# Patient Record
Sex: MALE | Race: WHITE | NOT HISPANIC OR LATINO | URBAN - METROPOLITAN AREA
[De-identification: names, ages, dates, MRNs, and addresses within clinical notes are randomized per-mention and may not be internally consistent; named-entity substitution may affect disease eponyms.]

---

## 2017-01-24 ENCOUNTER — COMMUNICATION - HEALTHEAST (OUTPATIENT)
Dept: INTERNAL MEDICINE | Facility: CLINIC | Age: 42
End: 2017-01-24

## 2017-01-24 DIAGNOSIS — K60.2 ANAL FISSURE: ICD-10-CM

## 2017-05-05 ENCOUNTER — COMMUNICATION - HEALTHEAST (OUTPATIENT)
Dept: INTERNAL MEDICINE | Facility: CLINIC | Age: 42
End: 2017-05-05

## 2017-08-14 ENCOUNTER — COMMUNICATION - HEALTHEAST (OUTPATIENT)
Dept: INTERNAL MEDICINE | Facility: CLINIC | Age: 42
End: 2017-08-14

## 2017-08-14 DIAGNOSIS — J45.909 ASTHMA: ICD-10-CM

## 2017-08-14 DIAGNOSIS — K60.2 ANAL FISSURE: ICD-10-CM

## 2017-10-19 ENCOUNTER — COMMUNICATION - HEALTHEAST (OUTPATIENT)
Dept: INTERNAL MEDICINE | Facility: CLINIC | Age: 42
End: 2017-10-19

## 2017-11-22 ENCOUNTER — COMMUNICATION - HEALTHEAST (OUTPATIENT)
Dept: INTERNAL MEDICINE | Facility: CLINIC | Age: 42
End: 2017-11-22

## 2017-11-29 ENCOUNTER — OFFICE VISIT - HEALTHEAST (OUTPATIENT)
Dept: INTERNAL MEDICINE | Facility: CLINIC | Age: 42
End: 2017-11-29

## 2017-11-29 DIAGNOSIS — J45.909 ASTHMA: ICD-10-CM

## 2017-11-29 DIAGNOSIS — M10.9 GOUT: ICD-10-CM

## 2017-11-29 DIAGNOSIS — I10 ESSENTIAL HYPERTENSION: ICD-10-CM

## 2017-11-29 RX ORDER — AMLODIPINE BESYLATE 10 MG/1
10 TABLET ORAL DAILY
Qty: 30 TABLET | Refills: 11 | Status: SHIPPED | OUTPATIENT
Start: 2017-11-29

## 2017-11-29 RX ORDER — VENLAFAXINE HYDROCHLORIDE 37.5 MG/1
37.5 CAPSULE, EXTENDED RELEASE ORAL DAILY
Qty: 30 CAPSULE | Refills: 2 | Status: SHIPPED | OUTPATIENT
Start: 2017-11-29

## 2017-11-29 RX ORDER — LISINOPRIL 40 MG/1
40 TABLET ORAL DAILY
Qty: 30 TABLET | Refills: 11 | Status: SHIPPED | OUTPATIENT
Start: 2017-11-29

## 2017-11-30 ENCOUNTER — COMMUNICATION - HEALTHEAST (OUTPATIENT)
Dept: INTERNAL MEDICINE | Facility: CLINIC | Age: 42
End: 2017-11-30

## 2017-12-05 ENCOUNTER — OFFICE VISIT - HEALTHEAST (OUTPATIENT)
Dept: INTERNAL MEDICINE | Facility: CLINIC | Age: 42
End: 2017-12-05

## 2017-12-05 DIAGNOSIS — I10 ESSENTIAL HYPERTENSION: ICD-10-CM

## 2017-12-05 DIAGNOSIS — M10.9 GOUT: ICD-10-CM

## 2017-12-11 ENCOUNTER — COMMUNICATION - HEALTHEAST (OUTPATIENT)
Dept: INTERNAL MEDICINE | Facility: CLINIC | Age: 42
End: 2017-12-11

## 2017-12-11 DIAGNOSIS — I10 ESSENTIAL HYPERTENSION: ICD-10-CM

## 2018-01-08 ENCOUNTER — OFFICE VISIT - HEALTHEAST (OUTPATIENT)
Dept: INTERNAL MEDICINE | Facility: CLINIC | Age: 43
End: 2018-01-08

## 2018-01-08 DIAGNOSIS — Z00.00 ROUTINE GENERAL MEDICAL EXAMINATION AT A HEALTH CARE FACILITY: ICD-10-CM

## 2018-01-08 DIAGNOSIS — J45.909 ASTHMA: ICD-10-CM

## 2018-01-08 DIAGNOSIS — K21.9 GERD (GASTROESOPHAGEAL REFLUX DISEASE): ICD-10-CM

## 2018-01-08 DIAGNOSIS — I10 ESSENTIAL HYPERTENSION: ICD-10-CM

## 2018-01-08 DIAGNOSIS — M10.9 GOUT: ICD-10-CM

## 2018-01-08 ASSESSMENT — MIFFLIN-ST. JEOR: SCORE: 1946.1

## 2018-01-24 ENCOUNTER — COMMUNICATION - HEALTHEAST (OUTPATIENT)
Dept: INTERNAL MEDICINE | Facility: CLINIC | Age: 43
End: 2018-01-24

## 2018-01-24 DIAGNOSIS — I10 HTN (HYPERTENSION): ICD-10-CM

## 2018-02-15 ENCOUNTER — COMMUNICATION - HEALTHEAST (OUTPATIENT)
Dept: INTERNAL MEDICINE | Facility: CLINIC | Age: 43
End: 2018-02-15

## 2018-02-15 DIAGNOSIS — K60.2 ANAL FISSURE: ICD-10-CM

## 2018-02-15 DIAGNOSIS — K21.9 GERD (GASTROESOPHAGEAL REFLUX DISEASE): ICD-10-CM

## 2018-02-23 ENCOUNTER — COMMUNICATION - HEALTHEAST (OUTPATIENT)
Dept: INTERNAL MEDICINE | Facility: CLINIC | Age: 43
End: 2018-02-23

## 2018-02-28 ENCOUNTER — OFFICE VISIT - HEALTHEAST (OUTPATIENT)
Dept: INTERNAL MEDICINE | Facility: CLINIC | Age: 43
End: 2018-02-28

## 2018-02-28 DIAGNOSIS — J45.909 ASTHMA: ICD-10-CM

## 2018-02-28 DIAGNOSIS — R05.9 COUGH: ICD-10-CM

## 2018-02-28 DIAGNOSIS — I10 ESSENTIAL HYPERTENSION: ICD-10-CM

## 2018-02-28 RX ORDER — BENZONATATE 200 MG/1
200 CAPSULE ORAL 3 TIMES DAILY PRN
Qty: 30 CAPSULE | Refills: 0 | Status: SHIPPED | OUTPATIENT
Start: 2018-02-28

## 2018-02-28 ASSESSMENT — MIFFLIN-ST. JEOR: SCORE: 1917.54

## 2018-03-23 ENCOUNTER — COMMUNICATION - HEALTHEAST (OUTPATIENT)
Dept: INTERNAL MEDICINE | Facility: CLINIC | Age: 43
End: 2018-03-23

## 2018-03-23 DIAGNOSIS — J45.909 ASTHMA: ICD-10-CM

## 2018-05-31 ENCOUNTER — COMMUNICATION - HEALTHEAST (OUTPATIENT)
Dept: INTERNAL MEDICINE | Facility: CLINIC | Age: 43
End: 2018-05-31

## 2018-05-31 DIAGNOSIS — K21.9 GERD (GASTROESOPHAGEAL REFLUX DISEASE): ICD-10-CM

## 2018-05-31 DIAGNOSIS — J45.909 ASTHMA: ICD-10-CM

## 2018-06-01 ENCOUNTER — AMBULATORY - HEALTHEAST (OUTPATIENT)
Dept: INTERNAL MEDICINE | Facility: CLINIC | Age: 43
End: 2018-06-01

## 2018-06-01 DIAGNOSIS — J45.909 ASTHMA: ICD-10-CM

## 2018-06-01 DIAGNOSIS — K21.9 GERD (GASTROESOPHAGEAL REFLUX DISEASE): ICD-10-CM

## 2018-06-01 RX ORDER — OMEPRAZOLE 40 MG/1
40 CAPSULE, DELAYED RELEASE ORAL DAILY
Qty: 90 CAPSULE | Refills: 2 | Status: SHIPPED | OUTPATIENT
Start: 2018-06-01

## 2018-06-01 RX ORDER — ALBUTEROL SULFATE 90 UG/1
AEROSOL, METERED RESPIRATORY (INHALATION)
Qty: 1 EACH | Refills: 3 | Status: SHIPPED | OUTPATIENT
Start: 2018-06-01

## 2018-06-04 ENCOUNTER — OFFICE VISIT - HEALTHEAST (OUTPATIENT)
Dept: INTERNAL MEDICINE | Facility: CLINIC | Age: 43
End: 2018-06-04

## 2018-06-04 DIAGNOSIS — R07.89 CHEST WALL PAIN: ICD-10-CM

## 2018-10-02 ENCOUNTER — COMMUNICATION - HEALTHEAST (OUTPATIENT)
Dept: INTERNAL MEDICINE | Facility: CLINIC | Age: 43
End: 2018-10-02

## 2018-10-02 DIAGNOSIS — K60.2 ANAL FISSURE: ICD-10-CM

## 2018-10-03 RX ORDER — FLUOCINONIDE 0.5 MG/G
OINTMENT TOPICAL
Qty: 30 G | Refills: 1 | Status: SHIPPED | OUTPATIENT
Start: 2018-10-03

## 2021-05-31 VITALS — HEIGHT: 68 IN | WEIGHT: 243.3 LBS | BODY MASS INDEX: 36.87 KG/M2

## 2021-06-01 VITALS — HEIGHT: 68 IN | WEIGHT: 237 LBS | BODY MASS INDEX: 35.92 KG/M2

## 2021-06-01 VITALS — WEIGHT: 238 LBS | BODY MASS INDEX: 36.66 KG/M2

## 2021-06-14 NOTE — PROGRESS NOTES
ASSESSMENT/PLAN:  1. Hypertension  Much better control and no side effects or symptoms.  We discussed his labs including the elevated uric acid level.  Please see below    2. Gout  No episodes.  He is not taking allopurinol.  I do not want to add this to his group of meds at this time.  Instead, we will address it is physical when he returns in January or so.    3.  Depression symptoms- feeling better this week and doing okay on the current medication.  Continue same for now.  He would certainly benefit from some counseling as well.    Patient Instructions   Bring your blood pressure cuff with you to your next appointment.     Check your blood pressures 3-4 times per week. If you notice that your BP is remaining high, come back to be seen.       Return in about 4 weeks (around 1/2/2018) for Annual physical.    CHIEF COMPLAINT:  Chief Complaint   Patient presents with     Hypertension       HISTORY OF PRESENT ILLNESS:  Torey Renee is a 42 y.o. male presenting to the clinic today for hypertension. He was started on 5 mg of amlodipine daily as of November 29th. He continues to take 40 mg of lisinopril once daily. He denies any swelling in his ankles. He purchased a blood pressure cuff that he does not bring with him to the clinic today.     Hyperuricemia: His uric acid level was 9.0 as of 11/29 although he was physically asymptomatic of gout. He has been on allopurinol in the past but has not taken it for around one year. He has not had a flare-up of gout in over one year as well.     Depression: He feels better today. He does not feel as agitated and has been in a better mood. He has been going through things and waiting to see what happens.       REVIEW OF SYSTEMS:   Comprehensive review of systems negative except as noted above.    PFSH:  Reviewed as above.     TOBACCO USE:  History   Smoking Status     Never Smoker   Smokeless Tobacco     Never Used       VITALS:  Vitals:    12/05/17 0920   BP: 124/78  "  Pulse: 63     Wt Readings from Last 3 Encounters:   06/22/16 (!) 237 lb 8 oz (107.7 kg)   12/04/15 (!) 245 lb (111.1 kg)   10/12/15 (!) 239 lb 8 oz (108.6 kg)     Estimated body mass index is 36.54 kg/(m^2) as calculated from the following:    Height as of 6/22/16: 5' 7.6\" (1.717 m).    Weight as of 6/22/16: 237 lb 8 oz (107.7 kg).  The following are part of a depression follow up plan for the patient:  patient follow-up to return when and if necessary    PHYSICAL EXAM:  General Appearance: Alert, cooperative, no distress, appears stated age.  Extremities: No CCE.  Psychiatric:  His normal mood and affect.     Notes Reviewed, additional history from source other than patient (2 TOTAL): None.    Accessed Care Everywhere, Requested Records, Consult with Physician (1 TOTAL): None.     Radiology tests summarized or ordered (XR, CT, MRI, DXA, US) (1 TOTAL): None.    Labs reviewed or ordered (1 TOTAL): Reviewed labs from 11/29/17; CMP and uric acid.     Medicine tests reviewed or ordered (ECG, echocardiogram, colonoscopy, EGD, venous US) (1 TOTAL): None.    Independent review of ECG or XR (2 EACH): None.      The visit lasted a total of 8 minutes face to face with the patient. Over 50% of the time was spent counseling and educating the patient about HTN.    I, Xin León, am scribing for and in the presence of, Dr. Marcum.    I, Dr. Rodriguez, personally performed the services described in this documentation, as scribed by Xin León in my presence, and it is both accurate and complete.    MEDICATIONS:  Current Outpatient Prescriptions   Medication Sig Dispense Refill     albuterol (VENTOLIN HFA) 90 mcg/actuation inhaler Inhale 2 puffs four times a day as needed 18 g 0     amLODIPine (NORVASC) 10 MG tablet Take 1 tablet (10 mg total) by mouth daily. (Patient taking differently: Take 5 mg by mouth daily. ) 30 tablet 11     fluticasone (FLOVENT HFA) 110 mcg/actuation inhaler Inhale 2 puffs twice a day 1 " Inhaler 5     lisinopril (PRINIVIL,ZESTRIL) 40 MG tablet Take 1 tablet (40 mg total) by mouth daily. 30 tablet 11     venlafaxine (EFFEXOR XR) 37.5 MG 24 hr capsule Take 1 capsule (37.5 mg total) by mouth daily. 30 capsule 2     verapamil (CALAN-SR) 240 MG CR tablet Take 240 mg by mouth at bedtime.       No current facility-administered medications for this visit.        Total data points: 1

## 2021-06-14 NOTE — PROGRESS NOTES
Clinic Note    Assessment:     Assessment and Plan:  1. Essential hypertension  Poorly controlled.  He has had difficulties with gout symptoms so cannot add a thiazide.  Does not feel right on the verapamil.  Will continue lisinopril and add amlodipine 5 mg daily and come in for blood pressure check with Trena next week and will decide on increasing the dose at that time.  - lisinopril (PRINIVIL,ZESTRIL) 40 MG tablet; Take 1 tablet (40 mg total) by mouth daily.  Dispense: 30 tablet; Refill: 11  - Comprehensive Metabolic Panel  - HM2(CBC w/o Differential)  - Urinalysis    2. Asthma  No complaints at this time  - fluticasone (FLOVENT HFA) 110 mcg/actuation inhaler; Inhale 2 puffs twice a day  Dispense: 1 Inhaler; Refill: 5    3. Gout  No recurrent symptoms.  Apparently this only happened on the thiazide  - Comprehensive Metabolic Panel  - Uric Acid     4.  Depression-he did not like the way he felt on the fluoxetine is off of that.  He is not drinking on a daily basis but has had excessive amounts recently.  He knows it is harmful.  He knows he should stop.  He did not get counseling.  He did not read the book I suggested.  He described multiple depression related symptoms and I think he needs both counseling and medication.  Will try venlafaxine 37.5 mg once daily make sure that is taking the edge off and see if that is helping.  Increase the dose after couple weeks if we can.  Strongly encouraged him to quit drinking       Patient Instructions   Set up for a physical exam     Start amlodipine 5 mg daily, check bp here in one week and we will see if we need to increase the dose.    Get counseling asap    Venlafaxine 37.5 mg once per day.  Email in two weeks to let me know how you are feeling and if we should increase the dose.     Stop drinking and get help if needed     [facility] [employer] [place]- by Jose Morton         Return in about 4 weeks (around 12/27/2017) for bp and other.         Subjective:      Torey Renee  "is a 42 y.o. male comes in for follow-up of his hypertension, asthma, depression symptoms.  He did not end up getting counseling and did not read the book is suggested.  He has \"self medicated\".  Encourage that this is not the solution.  He knows this.  He knows his job is been difficult.  He states he just wants to be alone.  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  Again I pointed out how hopeful that is and they should seek counseling to help him with this.  There is no mention this visit of hurting himself or desire to hurt himself or not be here.  Being alone is what he discussed.  He did not like the way he felt on the medicines.    The following portions of the patient's history were reviewed and updated as appropriate: Past medical history, allergies, medicines,    Review of Systems:    As mentioned above.  No chest pain pressure tightness in the chest or troubles breathing.  No gout symptoms no swelling of legs.  History   Smoking Status     Never Smoker   Smokeless Tobacco     Never Used         Objective:     Vitals:    11/29/17 0849   BP: (!) 132/110   Pulse: 72       Exam:  Heart sounds are normal lungs are clear    Extremities without edema  Affect flat except the lid up when he talked about when he was take care of kids.      Patient Active Problem List   Diagnosis     Anal Fissure     Gout     Asymptomatic Hyperuricemia     Asthma     Hypertension     Current Outpatient Prescriptions   Medication Sig Dispense Refill     albuterol (VENTOLIN HFA) 90 mcg/actuation inhaler Inhale 2 puffs four times a day as needed 18 g 0     fluticasone (FLOVENT HFA) 110 mcg/actuation inhaler Inhale 2 puffs twice a day 1 Inhaler 5     lisinopril (PRINIVIL,ZESTRIL) 40 MG tablet Take 1 tablet (40 mg total) by mouth daily. 30 tablet 11     amLODIPine (NORVASC) 10 MG tablet Take 1 tablet (10 mg total) by mouth daily. 30 tablet 11     No current " facility-administered medications for this visit.          Spencer Marcum    11/29/2017

## 2021-06-15 NOTE — PROGRESS NOTES
ASSESSMENT:  1. Routine general medical examination at a health care facility  All up-to-date    2. Asthma  Well-controlled on current meds and will continue same.  A CT looks good    3. Gout  He had episodes when he was younger but none recently.  Without any evidence of recurrent symptoms then I would not suggest that he needs to be on a medicine.  I did offer that there are different opinions.  He does not wish to take a medicine.  If there are any complications related to hyperuricemia then we will consider allopurinol again.    4. Hypertension  Well-controlled on current meds although will decrease the amlodipine to 5 mg every other day which seems to be working.  He had some low blood pressure on that other previous dose.  I did check the half-life and it is between 30 and 50 hours and therefore probably is reasonable to go to every other day.  He will let me know if there are further problems with the medicine.    5. GERD (gastroesophageal reflux disease)  I think this is his source of nausea and will go ahead and put him on PPI 40 mg daily and follow-up in 1 month to see if it has resolved otherwise will need further testing.    PLAN:  Patient Instructions     Heat and massage for right arm pain. Try a tennis elbow band.     Search tennis elbow or lateral epicondylitis on [email or web address]     Vaseline on rash 2-3 times per day.    Omeprazole 40 mg once per day for a month. Follow up in one month.       No orders of the defined types were placed in this encounter.    Medications Discontinued During This Encounter   Medication Reason     verapamil (VERELAN PM) 240 MG 24 hr capsule Dose adjustment       Return in about 1 month (around 2/8/2018) for follow up on nausea and GERD symptoms.    ASSESSED PROBLEMS:  Problem List Items Addressed This Visit     Gout    Asthma    Hypertension      Other Visit Diagnoses     Routine general medical examination at a health care facility    -  Primary    GERD (gastroesophageal  "reflux disease)              CHIEF COMPLAINT:  Chief Complaint   Patient presents with     Annual Exam     Hypertension     right arm pain for several months       HISTORY OF PRESENT ILLNESS:  Torey Renee is a 42 y.o. male presenting to the clinic today for an annual physical exam. He also has complaints of nausea and blood pressure changes.     Nausea: He occasionally wakes up feeling nauseous and will vomit. He tends to feel a little better after vomiting. It can be related to eating, but there are other times where it seems random. Tomatoes make it worse. He thinks it may be acid related.     Arm Pain: He has had pain in his right forearm for months. He is not sure what he did to irritate the arm, but he acknowledges that it could be from playing a lot of video games. He does a lot of work on a computer as well. It has been bothering him for quite some time, so he would like to know if there is anything he could do for it.     Hypertension: He had an episode a couple weeks ago where he felt confused and \"like he could just fade away and stop breathing.\" He checked his blood pressure at that time and saw that it was very low. His blood pressure was running a little low after that as well, so he adjusted his medications. He cut back his amlodipine from 10 mg daily to 5 mg every other day, which has worked quite well. He has not felt low or weak since then, and his blood pressure is in a good range today. He continues to take lisinopril and verapamil.     REVIEW OF SYSTEMS:   He continues to take 37.5 mg Effexor, and his mood has been better lately. His uric acid level was elevated the last time he had blood work done. He has not been having gout flare ups. He used to get them in the past with certain triggers. He had a non-HDL cholesterol level of 153 in 2014. He gets regular eye exams. His breathing has been stable lately.   See completed form B. Comprehensive review of systems negative except as noted " "above.    PFSH:  He has been cutting back on his alcohol intake. He does not do anything for exercise. He has been playing a lot of video games lately. He does not have a family history of prostate cancer.     No past medical history on file.  Past Surgical History:   Procedure Laterality Date     HERNIA REPAIR       No family history on file.  Social History     Social History     Marital status:      Spouse name: N/A     Number of children: N/A     Years of education: N/A     Occupational History     Not on file.     Social History Main Topics     Smoking status: Never Smoker     Smokeless tobacco: Never Used     Alcohol use Not on file     Drug use: Not on file     Sexual activity: Not on file     Other Topics Concern     Not on file     Social History Narrative       VITALS:  Vitals:    01/08/18 1300   BP: 114/72   Pulse: 63   Weight: (!) 243 lb 4.8 oz (110.4 kg)   Height: 5' 7.56\" (1.716 m)     Wt Readings from Last 3 Encounters:   01/08/18 (!) 243 lb 4.8 oz (110.4 kg)   06/22/16 (!) 237 lb 8 oz (107.7 kg)   12/04/15 (!) 245 lb (111.1 kg)     Body mass index is 37.48 kg/(m^2).    The following high BMI interventions were performed this visit: encouragement to exercise and weight monitoring    PHYSICAL EXAM:  General Appearance: Alert, cooperative, no distress, appears stated age.  HEENT: EMOI, fundi not observed, TMs normal, mouth and throat without lesions.  Neck: Supple without adenopathy or thyromegaly.  Back: No CVA tenderness or spinous process pain.  Lungs: Clear to auscultation bilaterally, good air movement.  Heart: Regular rate and rhythm, S1 and S2 normal, no murmur or bruit.  Abdomen: Soft, obese, non-tender, no HSM or masses.  Genitourinary: Normal male, testes descended without masses or hernias.  Rectal exam:  Normal size for age without nodules.  Musculoskeletal: Significant tenderness in right extensor forearm, symptoms and findings compatible with lateral epicondylitis  Extremities: No " CCE, pulses II/IV and symmetric,   Skin: Gluteal fold there is an open wound that looks like it is excoriated.  There is nothing concerning about it looks like it is an eczematous or irritated type area.  Through the top of the gluteal fold.  Encouraged him to use Vaseline 2-3 times per day for this.  That should allow to heal.  Lymph nodes: Cervical, supraclavicular, groin, and axillary nodes normal.  Neurologic: CNII-XII intact, strength V/V and symmetric, DTRs II/IV and symmetric, sensory grossly intact  Psychiatric: he has a normal mood and affect.     Notes Reviewed, additional history from source other than patient (2 TOTAL): None.    Accessed Care Everywhere, Requested Records, Consult with Physician (1 TOTAL): None.     Radiology tests summarized or ordered (XR, CT, MRI, DXA, US): None.    Labs reviewed or ordered (1 TOTAL): Labs from 11/29/2017 reviewed. CMP, CBC normal. Uric acid elevated. Urine normal. Reviewed 2014 cholesterol - Non HDL of 153.     Medicine tests reviewed or ordered (ECG, echocardiogram, colonoscopy, EGD, venous US) (1 TOTAL): None.    Independent review of ECG or XR (2 EACH): None.    The visit lasted a total of 25 minutes face to face with the patient. Over 50% of the time was spent counseling and educating the patient about health maintenance.    IJoao, am scribing for and in the presence of, Dr. Marcum.    I, Dr. marcum, personally performed the services described in this documentation, as scribed by Joao Godinez in my presence, and it is both accurate and complete.    MEDICATIONS:  Current Outpatient Prescriptions   Medication Sig Dispense Refill     albuterol (VENTOLIN HFA) 90 mcg/actuation inhaler Inhale 2 puffs four times a day as needed 18 g 0     amLODIPine (NORVASC) 10 MG tablet Take 1 tablet (10 mg total) by mouth daily. (Patient taking differently: Take 5 mg by mouth every other day. ) 30 tablet 11     fluticasone (FLOVENT HFA) 110 mcg/actuation inhaler  Inhale 2 puffs twice a day 1 Inhaler 5     lisinopril (PRINIVIL,ZESTRIL) 40 MG tablet Take 1 tablet (40 mg total) by mouth daily. 30 tablet 11     venlafaxine (EFFEXOR XR) 37.5 MG 24 hr capsule Take 1 capsule (37.5 mg total) by mouth daily. 30 capsule 2     verapamil (CALAN-SR) 240 MG CR tablet Take 240 mg by mouth at bedtime.       omeprazole (PRILOSEC) 40 MG capsule Take 1 capsule (40 mg total) by mouth daily. 30 capsule 0     No current facility-administered medications for this visit.        Total data points: 1

## 2021-06-16 NOTE — PROGRESS NOTES
"  Office Visit - Follow Up   Torey Renee   42 y.o. male    Date of Visit: 2/28/2018    Chief Complaint   Patient presents with     Cough     productive cough x5 days      Fall     \"passed out \" while doing stretches and hit head last week, wife found him        Assessment and Plan   1. Cough  Complains of cough for 5 days.  No fever.  Feels short of breath because of his underlying asthma.  Wheezing at times.  Uses albuterol inhaler more often than normal.  Also takes Flovent.  Will treat with Tessalon Perles 3 times a day for his cough.  - benzonatate (TESSALON) 200 MG capsule; Take 1 capsule (200 mg total) by mouth 3 (three) times a day as needed for cough.  Dispense: 30 capsule; Refill: 0    2. Asthma  Has asthma.  Will continue albuterol inhaler 2564 times a day as needed and Flovent 2 puffs 2 times a day.  Will empirically give him  Z-Charli for 5 days.  - azithromycin (ZITHROMAX) 250 MG tablet; Take 2 tablets by mouth day one and 1 tablet by mouth days 2-5  Dispense: 6 tablet; Refill: 0    3. Hypertension  Controlled.  Continue same medications.    Apparently  felt lightheaded and almost passed out doing his stretching exercise while bending over a week ago.  Did not happen again.  Advised to see Dr. Rhodes if this happens again.  Informed this was a vasovagal phenomenon.      Follow up as needed.  But to follow up with Dr. Rhodes if his cough persists despite above treatment     History of Present Illness   This 42 y.o. old male patient of Dr. Rhodes, came to see me for cough.  Complains of productive cough for 5 days.  Cough comes and goes.  Also describes some shortness of breath and wheezing because of his underlying asthma.  Uses his albuterol inhaler more often than normal.  Also uses maintenance Flovent inhaler.  Reports he had a fleeting episode of lightheadedness and almost to the point of passing out after doing his stretching exercises while bending over week ago.  Did not happen again.  " "Has hypertension controlled by amlodipine, lisinopril and verapamil.    Review of Systems   A 12 point comprehensive review of systems was negative except as noted..     Medications, Allergies and Problem List   Reviewed and updated             Chief Complaint   Cough (productive cough x5 days ) and Fall (\"passed out \" while doing stretches and hit head last week, wife found him)       Patient Profile   Social History     Social History Narrative        Past Medical History   Patient Active Problem List   Diagnosis     Gout     Asymptomatic Hyperuricemia     Asthma     Hypertension       Past Surgical History  He has a past surgical history that includes Hernia repair.       Medications and Allergies   Current Outpatient Prescriptions   Medication Sig     albuterol (VENTOLIN HFA) 90 mcg/actuation inhaler Inhale 2 puffs four times a day as needed     amLODIPine (NORVASC) 10 MG tablet Take 1 tablet (10 mg total) by mouth daily. (Patient taking differently: Take 5 mg by mouth every other day. )     fluocinonide (LIDEX) 0.05 % ointment Apply an inch as directed as needed (generic. Lidex)     fluticasone (FLOVENT HFA) 110 mcg/actuation inhaler Inhale 2 puffs twice a day     lisinopril (PRINIVIL,ZESTRIL) 40 MG tablet Take 1 tablet (40 mg total) by mouth daily.     omeprazole (PRILOSEC) 40 MG capsule Take 1 capsule by mouth daily     venlafaxine (EFFEXOR XR) 37.5 MG 24 hr capsule Take 1 capsule (37.5 mg total) by mouth daily.     verapamil (CALAN-SR) 240 MG CR tablet Take 240 mg by mouth at bedtime.     azithromycin (ZITHROMAX) 250 MG tablet Take 2 tablets by mouth day one and 1 tablet by mouth days 2-5     benzonatate (TESSALON) 200 MG capsule Take 1 capsule (200 mg total) by mouth 3 (three) times a day as needed for cough.     Allergies   Allergen Reactions     Demerol [Meperidine] Nausea And Vomiting     Aspirin         Family and Social History   No family history on file.     Social History   Substance Use Topics     " "Smoking status: Never Smoker     Smokeless tobacco: Never Used     Alcohol use None        Physical Exam       Physical Exam  /80  Pulse 88  Temp 98.8  F (37.1  C) (Oral)   Ht 5' 7.56\" (1.716 m)  Wt (!) 237 lb (107.5 kg)  SpO2 98%  BMI 36.51 kg/m2  General appearance: alert, appears stated age, cooperative and no distress  Head: Normocephalic, without obvious abnormality, atraumatic  Throat: lips, mucosa, and tongue normal; teeth and gums normal  Neck: no adenopathy, no carotid bruit, no JVD, supple, symmetrical, trachea midline and thyroid not enlarged, symmetric, no tenderness/mass/nodules  Lungs: Basically clear with some harshness of his breath sounds  Heart: regular rate and rhythm, S1, S2 normal, no murmur, click, rub or gallop  Abdomen: soft, non-tender; bowel sounds normal; no masses,  no organomegaly  Extremities: extremities normal, atraumatic, no cyanosis or edema  Skin: Skin color, texture, turgor normal. No rashes or lesions  Neurologic: Grossly normal     Additional Information        Bebeto Albert MD  Internal Medicine  Contact me at [phone]     Additional Information   Current Outpatient Prescriptions   Medication Sig     albuterol (VENTOLIN HFA) 90 mcg/actuation inhaler Inhale 2 puffs four times a day as needed     amLODIPine (NORVASC) 10 MG tablet Take 1 tablet (10 mg total) by mouth daily. (Patient taking differently: Take 5 mg by mouth every other day. )     fluocinonide (LIDEX) 0.05 % ointment Apply an inch as directed as needed (generic. Lidex)     fluticasone (FLOVENT HFA) 110 mcg/actuation inhaler Inhale 2 puffs twice a day     lisinopril (PRINIVIL,ZESTRIL) 40 MG tablet Take 1 tablet (40 mg total) by mouth daily.     omeprazole (PRILOSEC) 40 MG capsule Take 1 capsule by mouth daily     venlafaxine (EFFEXOR XR) 37.5 MG 24 hr capsule Take 1 capsule (37.5 mg total) by mouth daily.     verapamil (CALAN-SR) 240 MG CR tablet Take 240 mg by mouth at bedtime.     azithromycin " (ZITHROMAX) 250 MG tablet Take 2 tablets by mouth day one and 1 tablet by mouth days 2-5     benzonatate (TESSALON) 200 MG capsule Take 1 capsule (200 mg total) by mouth 3 (three) times a day as needed for cough.     Allergies   Allergen Reactions     Demerol [Meperidine] Nausea And Vomiting     Aspirin      Social History   Substance Use Topics     Smoking status: Never Smoker     Smokeless tobacco: Never Used     Alcohol use None         Time: total time spent with the patient was 25 minutes of which >50% was spent in counseling and coordination of care

## 2021-06-18 NOTE — PROGRESS NOTES
Torey comes in today for evaluation of left sided rib pain.  This has been occurring for about the 4 days.  He had a recurrence of his acid reflux at that time and had a lot of coughing and dry heaving associated with this.  He went back on his omeprazole afterwards but had some persistent rib pain.  This Saturday he was out with friends and sneezed hard.  He felt some significant pain in the left lateral chest wall which apparently made him sweat a little.  He denies any shortness of breath, hemoptysis, bruising on the skin and a complete review of systems is otherwise negative.  He states the pain is improving a bit over the last 48 hours.    Objective: Vital signs are as per the EMR.  General the patient is alert pleasant and in no acute distress.  He appears healthy.    Musculoskeletal: Tenderness to palpation over the interspace of the 11th to 12th ribs in the left lateral chest wall.    Lungs are clear to auscultation bilaterally.    Assessment and plan: Left intercostal muscle strain.  I explained that this would likely improve within the next 5-7 days.  He can use ice and over-the-counter analgesics.  Follow-up if not improved at that time    Hypertension, controlled.  Cont current medications.

## 2021-08-22 ENCOUNTER — HEALTH MAINTENANCE LETTER (OUTPATIENT)
Age: 46
End: 2021-08-22

## 2021-10-17 ENCOUNTER — HEALTH MAINTENANCE LETTER (OUTPATIENT)
Age: 46
End: 2021-10-17

## 2022-10-01 ENCOUNTER — HEALTH MAINTENANCE LETTER (OUTPATIENT)
Age: 47
End: 2022-10-01

## 2023-10-21 ENCOUNTER — HEALTH MAINTENANCE LETTER (OUTPATIENT)
Age: 48
End: 2023-10-21

## 2025-01-22 ENCOUNTER — OFFICE VISIT (OUTPATIENT)
Dept: FAMILY MEDICINE | Facility: CLINIC | Age: 50
End: 2025-01-22
Payer: COMMERCIAL

## 2025-01-22 VITALS
HEART RATE: 54 BPM | WEIGHT: 258 LBS | DIASTOLIC BLOOD PRESSURE: 91 MMHG | RESPIRATION RATE: 16 BRPM | TEMPERATURE: 97.9 F | OXYGEN SATURATION: 93 % | SYSTOLIC BLOOD PRESSURE: 138 MMHG | HEIGHT: 67 IN | BODY MASS INDEX: 40.49 KG/M2

## 2025-01-22 DIAGNOSIS — Z78.9 UNHEALTHY ALCOHOL CONSUMPTION: ICD-10-CM

## 2025-01-22 DIAGNOSIS — Z00.00 ENCOUNTER FOR MEDICAL EXAMINATION TO ESTABLISH CARE: Primary | ICD-10-CM

## 2025-01-22 DIAGNOSIS — Z87.19 HISTORY OF ANAL FISSURES: ICD-10-CM

## 2025-01-22 DIAGNOSIS — M1A.0720 IDIOPATHIC CHRONIC GOUT OF LEFT FOOT WITHOUT TOPHUS: ICD-10-CM

## 2025-01-22 DIAGNOSIS — I10 ESSENTIAL HYPERTENSION: ICD-10-CM

## 2025-01-22 DIAGNOSIS — R73.9 HYPERGLYCEMIA: ICD-10-CM

## 2025-01-22 DIAGNOSIS — Z11.59 ENCOUNTER FOR HCV SCREENING TEST FOR LOW RISK PATIENT: ICD-10-CM

## 2025-01-22 DIAGNOSIS — I10 PRIMARY HYPERTENSION: ICD-10-CM

## 2025-01-22 DIAGNOSIS — Z11.4 SCREENING FOR HIV (HUMAN IMMUNODEFICIENCY VIRUS): ICD-10-CM

## 2025-01-22 DIAGNOSIS — J45.20 MILD INTERMITTENT ASTHMA WITHOUT COMPLICATION: ICD-10-CM

## 2025-01-22 LAB
BASOPHILS # BLD AUTO: 0.1 10E3/UL (ref 0–0.2)
BASOPHILS NFR BLD AUTO: 1 %
EOSINOPHIL # BLD AUTO: 0.4 10E3/UL (ref 0–0.7)
EOSINOPHIL NFR BLD AUTO: 7 %
ERYTHROCYTE [DISTWIDTH] IN BLOOD BY AUTOMATED COUNT: 12.2 % (ref 10–15)
HCT VFR BLD AUTO: 43.1 % (ref 40–53)
HGB BLD-MCNC: 15.1 G/DL (ref 13.3–17.7)
IMM GRANULOCYTES # BLD: 0 10E3/UL
IMM GRANULOCYTES NFR BLD: 0 %
LYMPHOCYTES # BLD AUTO: 2.1 10E3/UL (ref 0.8–5.3)
LYMPHOCYTES NFR BLD AUTO: 32 %
MCH RBC QN AUTO: 32.3 PG (ref 26.5–33)
MCHC RBC AUTO-ENTMCNC: 35 G/DL (ref 31.5–36.5)
MCV RBC AUTO: 92 FL (ref 78–100)
MONOCYTES # BLD AUTO: 0.4 10E3/UL (ref 0–1.3)
MONOCYTES NFR BLD AUTO: 7 %
NEUTROPHILS # BLD AUTO: 3.4 10E3/UL (ref 1.6–8.3)
NEUTROPHILS NFR BLD AUTO: 53 %
PLATELET # BLD AUTO: 249 10E3/UL (ref 150–450)
RBC # BLD AUTO: 4.67 10E6/UL (ref 4.4–5.9)
WBC # BLD AUTO: 6.3 10E3/UL (ref 4–11)

## 2025-01-22 RX ORDER — LISINOPRIL 40 MG/1
40 TABLET ORAL DAILY
Qty: 30 TABLET | Refills: 11 | Status: SHIPPED | OUTPATIENT
Start: 2025-01-22

## 2025-01-22 RX ORDER — ALBUTEROL SULFATE 90 UG/1
2 INHALANT RESPIRATORY (INHALATION) EVERY 6 HOURS PRN
Qty: 18 G | Refills: 3 | Status: SHIPPED | OUTPATIENT
Start: 2025-01-22

## 2025-01-22 RX ORDER — LISINOPRIL 40 MG/1
40 TABLET ORAL DAILY
Qty: 90 TABLET | OUTPATIENT
Start: 2025-01-22

## 2025-01-22 RX ORDER — ALLOPURINOL 300 MG/1
300 TABLET ORAL DAILY
Qty: 90 TABLET | Refills: 3 | Status: SHIPPED | OUTPATIENT
Start: 2025-01-22

## 2025-01-22 RX ORDER — FLUOCINONIDE 0.5 MG/G
OINTMENT TOPICAL 2 TIMES DAILY
Qty: 30 G | Refills: 1 | Status: SHIPPED | OUTPATIENT
Start: 2025-01-22

## 2025-01-22 RX ORDER — ALLOPURINOL 300 MG/1
300 TABLET ORAL DAILY
COMMUNITY
Start: 2025-01-22 | End: 2025-01-22

## 2025-01-22 ASSESSMENT — ASTHMA QUESTIONNAIRES
ACT_TOTALSCORE: 20
QUESTION_3 LAST FOUR WEEKS HOW OFTEN DID YOUR ASTHMA SYMPTOMS (WHEEZING, COUGHING, SHORTNESS OF BREATH, CHEST TIGHTNESS OR PAIN) WAKE YOU UP AT NIGHT OR EARLIER THAN USUAL IN THE MORNING: NOT AT ALL
ACT_TOTALSCORE: 20
QUESTION_5 LAST FOUR WEEKS HOW WOULD YOU RATE YOUR ASTHMA CONTROL: COMPLETELY CONTROLLED
QUESTION_1 LAST FOUR WEEKS HOW MUCH OF THE TIME DID YOUR ASTHMA KEEP YOU FROM GETTING AS MUCH DONE AT WORK, SCHOOL OR AT HOME: ALL OF THE TIME
QUESTION_2 LAST FOUR WEEKS HOW OFTEN HAVE YOU HAD SHORTNESS OF BREATH: NOT AT ALL
QUESTION_4 LAST FOUR WEEKS HOW OFTEN HAVE YOU USED YOUR RESCUE INHALER OR NEBULIZER MEDICATION (SUCH AS ALBUTEROL): ONCE A WEEK OR LESS

## 2025-01-22 NOTE — LETTER
January 23, 2025      Torey Renee  [address] [place] [address]        Main Lem[place],    Please see below for your test results.    Your electorlytes (Sodium, potassium, etc) are all normal. Your blood sugar was a little elevated  Tests of your liver (ALT, AST, GGT) are normal  Your cholesterol levels are on the high side.   Blood counts (WBC, RBC, Hgb, etc) are normal.  Your uric acid level is in a good place.  Screening for Hepatitis C and HIV was negative (normal).    For your blood sugar, I recommend coming in for a lab only visit to check an A1c for diabetes. We will call about this too.    Your cholesterol levels are on the border of us consider a statin medication to reduce risks of heart attack and stroke. Additionally, reducing your alcohol intake and exercise will also reduce these risks. Please follow-up with me to review further.     Resulted Orders   Comprehensive metabolic panel   Result Value Ref Range    Sodium 138 135 - 145 mmol/L    Potassium 5.0 3.4 - 5.3 mmol/L    Carbon Dioxide (CO2) 27 22 - 29 mmol/L    Anion Gap 8 7 - 15 mmol/L    Urea Nitrogen 15.2 6.0 - 20.0 mg/dL    Creatinine 0.97 0.67 - 1.17 mg/dL    GFR Estimate >90 >60 mL/min/1.73m2      Comment:      eGFR calculated using 2021 CKD-EPI equation.    Calcium 9.6 8.8 - 10.4 mg/dL      Comment:      Reference intervals for this test were updated on 7/16/2024 to reflect our healthy population more accurately. There may be differences in the flagging of prior results with similar values performed with this method. Those prior results can be interpreted in the context of the updated reference intervals.    Chloride 103 98 - 107 mmol/L    Glucose 125 (H) 70 - 99 mg/dL    Alkaline Phosphatase 78 40 - 150 U/L    AST 31 0 - 45 U/L    ALT 29 0 - 70 U/L    Protein Total 7.0 6.4 - 8.3 g/dL    Albumin 4.2 3.5 - 5.2 g/dL    Bilirubin Total 0.3 <=1.2 mg/dL    Patient Fasting > 8hrs? Unknown    GGT   Result Value Ref Range    GGT 54 8 - 61  U/L   Uric acid   Result Value Ref Range    Uric Acid 4.6 3.4 - 7.0 mg/dL   Lipid panel reflex to direct LDL Fasting   Result Value Ref Range    Cholesterol 192 <200 mg/dL    Triglycerides 299 (H) <150 mg/dL    Direct Measure HDL 35 (L) >=40 mg/dL    LDL Cholesterol Calculated 97 <100 mg/dL    Non HDL Cholesterol 157 (H) <130 mg/dL    Patient Fasting > 8hrs? Unknown     Narrative    Cholesterol  Desirable: < 200 mg/dL  Borderline High: 200 - 239 mg/dL  High: >= 240 mg/dL    Triglycerides  Normal: < 150 mg/dL  Borderline High: 150 - 199 mg/dL  High: 200-499 mg/dL  Very High: >= 500 mg/dL    Direct Measure HDL  Female: >= 50 mg/dL   Male: >= 40 mg/dL    LDL Cholesterol  Desirable: < 100 mg/dL  Above Desirable: 100 - 129 mg/dL   Borderline High: 130 - 159 mg/dL   High:  160 - 189 mg/dL   Very High: >= 190 mg/dL    Non HDL Cholesterol  Desirable: < 130 mg/dL  Above Desirable: 130 - 159 mg/dL  Borderline High: 160 - 189 mg/dL  High: 190 - 219 mg/dL  Very High: >= 220 mg/dL   HIV Antigen Antibody Combo Cascade   Result Value Ref Range    HIV Antigen Antibody Combo Nonreactive Nonreactive      Comment:      Negative HIV-1 p24 antigen and HIV-1/2 antibody screening test results usually indicate the absence of HIV-1 and HIV-2 infection. However, such negative results do not rule-out acute HIV infection.  If acute HIV-1 or HIV-2 infection is suspected, detection of HIV-1 or HIV-2 RNA  is recommended. This result is obtained using the Roche Elecsys HIV Duo method on the blanca [id number] immunoassay analyzer.   Hepatitis C Screen Reflex to HCV RNA Quant and Genotype   Result Value Ref Range    Hepatitis C Antibody Nonreactive Nonreactive      Comment:      A nonreactive screening test result does not exclude the possibility of exposure to or infection with HCV. Nonreactive screening test results in individuals with prior exposure to HCV may be due to antibody levels below the limit of detection of this assay or lack of reactivity to  the HCV antigens used in this assay. Patients with recent HCV infections (<3 months from time of exposure) may have false-negative HCV antibody results due to the time needed for seroconversion (average of 8 to 9 weeks).   CBC with platelets and differential   Result Value Ref Range    WBC Count 6.3 4.0 - 11.0 10e3/uL    RBC Count 4.67 4.40 - 5.90 10e6/uL    Hemoglobin 15.1 13.3 - 17.7 g/dL    Hematocrit 43.1 40.0 - 53.0 %    MCV 92 78 - 100 fL    MCH 32.3 26.5 - 33.0 pg    MCHC 35.0 31.5 - 36.5 g/dL    RDW 12.2 10.0 - 15.0 %    Platelet Count 249 150 - 450 10e3/uL    % Neutrophils 53 %    % Lymphocytes 32 %    % Monocytes 7 %    % Eosinophils 7 %    % Basophils 1 %    % Immature Granulocytes 0 %    Absolute Neutrophils 3.4 1.6 - 8.3 10e3/uL    Absolute Lymphocytes 2.1 0.8 - 5.3 10e3/uL    Absolute Monocytes 0.4 0.0 - 1.3 10e3/uL    Absolute Eosinophils 0.4 0.0 - 0.7 10e3/uL    Absolute Basophils 0.1 0.0 - 0.2 10e3/uL    Absolute Immature Granulocytes 0.0 <=0.4 10e3/uL       If you have any questions, please call the clinic to make an appointment.    Sincerely,    Benjamin Rosenstein, MD

## 2025-01-22 NOTE — PROGRESS NOTES
Assessment & Plan     Torey Renee is a 49 year old male, works with the [employer] as , with pmhx including HTN, gout, asthma seen today to establish care    (Z00.00) Encounter for medical examination to establish care  (primary encounter diagnosis)  Comment: History is reviewed and updated.  Labs ordered as below.  Plan: Follow-up as needed pending findings  -    (I10) Primary hypertension  Comment: Modestly elevated today, though has been without medications and notes   Missing doses.  Refill today.  Obtain labs for additional risk factor evaluation.  Plan: Comprehensive metabolic panel, GGT, CBC with         Platelets & Differential, Lipid panel reflex to        direct LDL Fasting, lisinopril (ZESTRIL) 40 MG         tablet          (Z78.9) Unhealthy alcohol consumption  Comment: Reviewed significantly elevated overall alcohol use, he notes no concerns except associated weight gain.  Continue to review and discuss reductions.    Plan:   -CMP and GGT as above    (Z68.41) Adult BMI 40.0-44.9 kg/sq m (H)  Comment: Related to decreased activity and likely alcohol use.  He is interested in increasing his activity level.  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  Continues on allopurinol daily, refilled today.  Uric acid for monitoring  Plan: Uric acid, allopurinol (ZYLOPRIM) 300 MG tablet          (Z87.19) History of anal fissures  Comment: Refilled today   Plan: fluocinonide (LIDEX) 0.05 % external ointment          (J45.20) Mild intermittent asthma without complication  Comment: Limited use of albuterol, relatively well-controlled symptoms.  Refilled today  Plan: albuterol (PROAIR HFA/PROVENTIL HFA/VENTOLIN         HFA) 108 (90 Base) MCG/ACT inhaler          (Z11.59) Encounter for HCV screening test for low risk patient  Comment: Discussed recommended screening, low risk  Plan: Hepatitis C Screen Reflex to HCV RNA  "Quant and         Genotype          (Z11.4) Screening for HIV (human immunodeficiency virus)  Comment: Discussed recommended screening for low risk  Plan: HIV Antigen Antibody Combo Cascade         Benjamin Rosenstein, MD, MA  [facility]     This note was completed with the assistance of dictation software. Typos and word substitution-errors are expected and unintended.      MDM: Chronic condition w/exacerbation; med mgmt    The longitudinal plan of care for the diagnosis(es)/condition(s) as documented were addressed during this visit. Due to the added complexity in care, I will continue to support Torey in the subsequent management and with ongoing continuity of care.    Akira Lema is a 49 year old, presenting for the following health issues:  Establish Care (Establish care) and Refill Request (Refill request/)    HPI     Change of insurance. Re-establish with primary care.     Specific concerns  -Medication refills    Does continue have intermittent morning symptoms of reflux. Typically better with tums.     HTN - ran out of lisinpril 40mg couple days ago. He does miss doses at times. Does have BP cuff, plans to start using more. Typically in the \"good range.\" No chest pain, pressure. Headache, visual change, no SOB.     Gout - take allopurinol 300mg daily. Last flare about 8 years ago - typically podagra (left toe).     Ashtma - intemrittent. Typical triggers are allergens and exercise. No recent asthma flares. Very rare use of albuterol.     Used to be more into running. Stopped due to knee pain. Has previously lost weight, has regained some back.     MedHx  -HTN  -Gout  -Eczema  -Asthma  -Broken right ankle (17 yo)    SurgHx  -Hernia repair as teenager  -Anal fistula excision  -Ankle surgery    FamHx  -HTN (dad's side)    SocHX  - for [employer]; 24 years  -Lives at home with wife and 1 adult child (other has moved out)  -No cigarette smoking. Smokes " "cannabis daily  -Alcohol: 2-5 beers daily x 10+ years (not concerns). Follows work associated rules  -No other substances  -No sexual health concerns  -Enjoys board and [employer] games        Objective    BP (!) 138/91   Pulse 54   Temp 97.9  F (36.6  C) (Oral)   Resp 16   Ht 1.702 m (5' 7\")   Wt 117 kg (258 lb)   SpO2 93%   BMI 40.41 kg/m    Body mass index is 40.41 kg/m .  Physical Exam     General: Awake, seated comfortably, appears well and NAD   HEENT:  - Head: Atraumatic, normocephalic  - Eyes: Corneas clear, sclera without injection, anicteric, no discharge, EOMI, PERRLA  CV: RRR, normal S1/S2, no murmurs/rubs/gallops, Radial and DP pulses 2/4   Pulm: Normal WOB, lungs CTAB, no wheezes or crackles, good air movement   GI: Abdomen soft, not tender, not distended, BS normal and active throughout   MSK: No gross deformities, full AROM throughout   Skin: Clear complexion, no rashes, lesions, or bruising   Neuro: Alert, answering questions appropriately, normal thought processes; Normal Gait   Pscyh: Mood normal with congruent affect, normal rate and volume of speech, insight/judgment intact            "

## 2025-01-23 LAB
ALBUMIN SERPL BCG-MCNC: 4.2 G/DL (ref 3.5–5.2)
ALP SERPL-CCNC: 78 U/L (ref 40–150)
ALT SERPL W P-5'-P-CCNC: 29 U/L (ref 0–70)
ANION GAP SERPL CALCULATED.3IONS-SCNC: 8 MMOL/L (ref 7–15)
AST SERPL W P-5'-P-CCNC: 31 U/L (ref 0–45)
BILIRUB SERPL-MCNC: 0.3 MG/DL
BUN SERPL-MCNC: 15.2 MG/DL (ref 6–20)
CALCIUM SERPL-MCNC: 9.6 MG/DL (ref 8.8–10.4)
CHLORIDE SERPL-SCNC: 103 MMOL/L (ref 98–107)
CHOLEST SERPL-MCNC: 192 MG/DL
CREAT SERPL-MCNC: 0.97 MG/DL (ref 0.67–1.17)
EGFRCR SERPLBLD CKD-EPI 2021: >90 ML/MIN/1.73M2
FASTING STATUS PATIENT QL REPORTED: ABNORMAL
FASTING STATUS PATIENT QL REPORTED: ABNORMAL
GGT SERPL-CCNC: 54 U/L (ref 8–61)
GLUCOSE SERPL-MCNC: 125 MG/DL (ref 70–99)
HCO3 SERPL-SCNC: 27 MMOL/L (ref 22–29)
HCV AB SERPL QL IA: NONREACTIVE
HDLC SERPL-MCNC: 35 MG/DL
HIV 1+2 AB+HIV1 P24 AG SERPL QL IA: NONREACTIVE
LDLC SERPL CALC-MCNC: 97 MG/DL
NONHDLC SERPL-MCNC: 157 MG/DL
POTASSIUM SERPL-SCNC: 5 MMOL/L (ref 3.4–5.3)
PROT SERPL-MCNC: 7 G/DL (ref 6.4–8.3)
SODIUM SERPL-SCNC: 138 MMOL/L (ref 135–145)
TRIGL SERPL-MCNC: 299 MG/DL
URATE SERPL-MCNC: 4.6 MG/DL (ref 3.4–7)

## 2025-01-27 ENCOUNTER — DOCUMENTATION ONLY (OUTPATIENT)
Dept: FAMILY MEDICINE | Facility: CLINIC | Age: 50
End: 2025-01-27
Payer: COMMERCIAL

## 2025-01-30 ENCOUNTER — ALLIED HEALTH/NURSE VISIT (OUTPATIENT)
Dept: FAMILY MEDICINE | Facility: CLINIC | Age: 50
End: 2025-01-30
Payer: COMMERCIAL

## 2025-01-30 ENCOUNTER — LAB (OUTPATIENT)
Dept: LAB | Facility: CLINIC | Age: 50
End: 2025-01-30
Payer: COMMERCIAL

## 2025-01-30 DIAGNOSIS — R73.9 HYPERGLYCEMIA: ICD-10-CM

## 2025-01-30 DIAGNOSIS — Z23 ENCOUNTER FOR IMMUNIZATION: Primary | ICD-10-CM

## 2025-01-30 LAB
EST. AVERAGE GLUCOSE BLD GHB EST-MCNC: 120 MG/DL
HBA1C MFR BLD: 5.8 % (ref 0–5.6)

## 2025-06-24 ENCOUNTER — OFFICE VISIT (OUTPATIENT)
Dept: FAMILY MEDICINE | Facility: CLINIC | Age: 50
End: 2025-06-24
Payer: COMMERCIAL

## 2025-06-24 ENCOUNTER — RESULTS FOLLOW-UP (OUTPATIENT)
Dept: FAMILY MEDICINE | Facility: CLINIC | Age: 50
End: 2025-06-24

## 2025-06-24 VITALS
SYSTOLIC BLOOD PRESSURE: 126 MMHG | HEIGHT: 67 IN | DIASTOLIC BLOOD PRESSURE: 82 MMHG | RESPIRATION RATE: 22 BRPM | HEART RATE: 67 BPM | TEMPERATURE: 97.9 F | WEIGHT: 248 LBS | BODY MASS INDEX: 38.92 KG/M2 | OXYGEN SATURATION: 96 %

## 2025-06-24 DIAGNOSIS — K29.70 GASTRITIS WITHOUT BLEEDING, UNSPECIFIED CHRONICITY, UNSPECIFIED GASTRITIS TYPE: ICD-10-CM

## 2025-06-24 DIAGNOSIS — E78.2 MIXED HYPERLIPIDEMIA: ICD-10-CM

## 2025-06-24 DIAGNOSIS — I10 PRIMARY HYPERTENSION: Primary | ICD-10-CM

## 2025-06-24 DIAGNOSIS — Z78.9 UNHEALTHY ALCOHOL CONSUMPTION: ICD-10-CM

## 2025-06-24 DIAGNOSIS — R73.9 HYPERGLYCEMIA: ICD-10-CM

## 2025-06-24 LAB
ANION GAP SERPL CALCULATED.3IONS-SCNC: 6 MMOL/L (ref 3–14)
BUN SERPL-MCNC: 17 MG/DL (ref 7–30)
CALCIUM SERPL-MCNC: 10 MG/DL (ref 8.5–10.1)
CHLORIDE BLD-SCNC: 108 MMOL/L (ref 94–109)
CO2 SERPL-SCNC: 27 MMOL/L (ref 20–32)
CREAT SERPL-MCNC: 1 MG/DL (ref 0.66–1.25)
EGFRCR SERPLBLD CKD-EPI 2021: >90 ML/MIN/1.73M2
GLUCOSE BLD-MCNC: 113 MG/DL (ref 70–99)
POTASSIUM BLD-SCNC: 5.2 MMOL/L (ref 3.4–5.3)
SODIUM SERPL-SCNC: 141 MMOL/L (ref 135–145)

## 2025-06-24 RX ORDER — FAMOTIDINE 20 MG/1
20 TABLET, FILM COATED ORAL 2 TIMES DAILY PRN
Qty: 30 TABLET | Refills: 1 | Status: SHIPPED | OUTPATIENT
Start: 2025-06-24 | End: 2025-06-24

## 2025-06-24 RX ORDER — FAMOTIDINE 20 MG/1
20 TABLET, FILM COATED ORAL 2 TIMES DAILY PRN
Qty: 30 TABLET | Refills: 1 | Status: SHIPPED | OUTPATIENT
Start: 2025-06-24

## 2025-06-24 NOTE — PROGRESS NOTES
[facility] [address] [place] [address]  Phone: [phone]  Fax: [phone]    Patient:  Torey Renee, Date of birth 1975  Date of Visit:  06/24/2025  Referring Provider Referred Self  Reason for visit: RECHECK (BP)    Assessment & Plan     Torey Renee is a 49 year old male with pmhx including HTN seen today for the following    (I10) Primary hypertension  (primary encounter diagnosis)  Comment: Improved with regular use of lisinopril daily.  He did note borderline elevated potassium of 5.2.  No changes today, will continue to monitor closely given potassium levels  Plan: Basic metabolic panel          (K29.70) Gastritis without bleeding, unspecified chronicity, unspecified gastritis type  Comment: Suspect cause of his nausea likely related to alcohol abuse. Send with Pepcid   plan: famotidine (PEPCID) 20 MG tablet          (Z78.9) Unhealthy alcohol consumption  Comment: Continues to have significant alcohol use primarily binging.  Again recommended overall reduced use for general risk factor reduction.    (E78.2) Mixed hyperlipidemia  Comment: Lipids mildly elevated previously.  Mild to moderate risk particularly with improved blood pressure control.  He does not wish to start a statin at this time.  Plan:   - Repeat lipids in approximately 1 year    (R73.9) Hyperglycemia  Comment: Noted on prior labs, A1c 5.8%.  Again recommended dietary modifications including decreased alcohol use.    Benjamin Rosenstein, MD, MA  [facility]     This note was completed with the assistance of dictation software. Typos and word substitution-errors are expected and unintended.      Consent was obtained from the patient to use an AI documentation tool in the creation of this note    MDM: 2+ chronic conditions, stable; medication  "mgmt  ----------------------------------------------------------------------------------------------------------------------------------------  Subjective   Pertinent history obtain from: patient    Torey presents for [facility] f/u    History of Present Illness      Presents today for follow-up on blood pressure.  He has been taking his lisinopril more regularly than previous.  Blood pressures are well-controlled.  He is not experiencing any side effects such as lightheadedness or dizziness.  No chest pain or pressure, shortness of breath.  No significant cough though states she is getting over a sinus affection.    He does note he has been experiencing nausea in the morning, sometimes leading to vomiting, especially when he has not eaten. He occasionally vomits sweet-tasting bile, which sometimes contains food from the previous day. He reports that he previously had an \"iron stomach\" but is now experiencing these symptoms. In the past, he was treated with a bile-reducing medication prescribed by a previous doctor. He suspects that he may have gallbladder issues, similar to his wife's past issues.     Torey has regular alcohol consumption, with variability in quantity; he sometimes drinks 4-5 beers in an evening. Acknowledges drinking more than 14 drinks a week. He experiences nausea regardless of alcohol consumption.       Objective     Vital signs:  /82   Pulse 67   Temp 97.9  F (36.6  C)   Resp 22   Ht 1.702 m (5' 7\")   Wt 112.5 kg (248 lb)   SpO2 96%   BMI 38.84 kg/m    Blood pressure 126/82, pulse 67, temperature 97.9  F (36.6  C), resp. rate 22, height 1.702 m (5' 7\"), weight 112.5 kg (248 lb), SpO2 96%.  Physical Exam      General: Awake, seated comfortably, appears well and NAD   CV: RRR, normal S1/S2, no murmurs/rubs/gallops, no LE edema  Pulm: Normal WOB, lungs CTAB, no wheezes or crackles, good air movement   Neuro: Alert, answering questions appropriately, normal thought processes;  Normal Gait "           Results    Results for orders placed or performed in visit on 06/24/25   Basic metabolic panel     Status: Abnormal   Result Value Ref Range    Sodium 141 135 - 145 mmol/L    Potassium 5.2 3.4 - 5.3 mmol/L    Chloride 108 94 - 109 mmol/L    Carbon Dioxide (CO2) 27 20 - 32 mmol/L    Anion Gap 6 3 - 14 mmol/L    Urea Nitrogen 17 7 - 30 mg/dL    Creatinine 1.00 0.66 - 1.25 mg/dL    GFR Estimate >90 >60 mL/min/1.73m2    Calcium 10.0 8.5 - 10.1 mg/dL    Glucose 113 (H) 70 - 99 mg/dL     Laboratory data and imaging listed below was reviewed prior to this encounter.